# Patient Record
Sex: MALE | Race: WHITE | NOT HISPANIC OR LATINO | Employment: OTHER | URBAN - METROPOLITAN AREA
[De-identification: names, ages, dates, MRNs, and addresses within clinical notes are randomized per-mention and may not be internally consistent; named-entity substitution may affect disease eponyms.]

---

## 2022-11-05 ENCOUNTER — HOSPITAL ENCOUNTER (OUTPATIENT)
Dept: RADIOLOGY | Facility: MEDICAL CENTER | Age: 72
End: 2022-11-05

## 2022-11-05 ENCOUNTER — HOSPITAL ENCOUNTER (EMERGENCY)
Facility: MEDICAL CENTER | Age: 72
End: 2022-11-05
Attending: EMERGENCY MEDICINE
Payer: MEDICARE

## 2022-11-05 VITALS
RESPIRATION RATE: 17 BRPM | OXYGEN SATURATION: 97 % | HEART RATE: 76 BPM | TEMPERATURE: 98.1 F | SYSTOLIC BLOOD PRESSURE: 129 MMHG | WEIGHT: 136.47 LBS | BODY MASS INDEX: 21.42 KG/M2 | HEIGHT: 67 IN | DIASTOLIC BLOOD PRESSURE: 88 MMHG

## 2022-11-05 DIAGNOSIS — H20.9 TRAUMATIC IRITIS: ICD-10-CM

## 2022-11-05 DIAGNOSIS — H21.02 HYPHEMA OF LEFT EYE: ICD-10-CM

## 2022-11-05 PROCEDURE — 700101 HCHG RX REV CODE 250: Performed by: EMERGENCY MEDICINE

## 2022-11-05 PROCEDURE — 99283 EMERGENCY DEPT VISIT LOW MDM: CPT

## 2022-11-05 PROCEDURE — 90715 TDAP VACCINE 7 YRS/> IM: CPT | Performed by: EMERGENCY MEDICINE

## 2022-11-05 PROCEDURE — 90471 IMMUNIZATION ADMIN: CPT

## 2022-11-05 PROCEDURE — 700111 HCHG RX REV CODE 636 W/ 250 OVERRIDE (IP): Performed by: EMERGENCY MEDICINE

## 2022-11-05 RX ORDER — PREDNISOLONE ACETATE 10 MG/ML
1 SUSPENSION/ DROPS OPHTHALMIC
Qty: 15 ML | Refills: 1 | Status: SHIPPED | OUTPATIENT
Start: 2022-11-05 | End: 2022-11-05 | Stop reason: SDUPTHER

## 2022-11-05 RX ORDER — CYCLOPENTOLATE HYDROCHLORIDE 10 MG/ML
1 SOLUTION/ DROPS OPHTHALMIC 3 TIMES DAILY
Qty: 15 ML | Refills: 0 | Status: SHIPPED | OUTPATIENT
Start: 2022-11-05

## 2022-11-05 RX ORDER — PROPARACAINE HYDROCHLORIDE 5 MG/ML
1 SOLUTION/ DROPS OPHTHALMIC ONCE
Status: COMPLETED | OUTPATIENT
Start: 2022-11-05 | End: 2022-11-05

## 2022-11-05 RX ORDER — PREDNISOLONE ACETATE 10 MG/ML
1 SUSPENSION/ DROPS OPHTHALMIC
Qty: 15 ML | Refills: 1 | Status: SHIPPED | OUTPATIENT
Start: 2022-11-05 | End: 2022-11-12

## 2022-11-05 RX ORDER — CYCLOPENTOLATE HYDROCHLORIDE 10 MG/ML
1 SOLUTION/ DROPS OPHTHALMIC 3 TIMES DAILY
Qty: 15 ML | Refills: 0 | Status: SHIPPED | OUTPATIENT
Start: 2022-11-05 | End: 2022-11-05 | Stop reason: SDUPTHER

## 2022-11-05 RX ORDER — M-VIT,TX,IRON,MINS/CALC/FOLIC 27MG-0.4MG
1 TABLET ORAL DAILY
COMMUNITY

## 2022-11-05 RX ADMIN — PROPARACAINE HYDROCHLORIDE 1 DROP: 5 SOLUTION/ DROPS OPHTHALMIC at 21:30

## 2022-11-05 RX ADMIN — FLUORESCEIN SODIUM 1 MG: 1 STRIP OPHTHALMIC at 21:30

## 2022-11-05 RX ADMIN — CLOSTRIDIUM TETANI TOXOID ANTIGEN (FORMALDEHYDE INACTIVATED), CORYNEBACTERIUM DIPHTHERIAE TOXOID ANTIGEN (FORMALDEHYDE INACTIVATED), BORDETELLA PERTUSSIS TOXOID ANTIGEN (GLUTARALDEHYDE INACTIVATED), BORDETELLA PERTUSSIS FILAMENTOUS HEMAGGLUTININ ANTIGEN (FORMALDEHYDE INACTIVATED), BORDETELLA PERTUSSIS PERTACTIN ANTIGEN, AND BORDETELLA PERTUSSIS FIMBRIAE 2/3 ANTIGEN 0.5 ML: 5; 2; 2.5; 5; 3; 5 INJECTION, SUSPENSION INTRAMUSCULAR at 20:47

## 2022-11-06 NOTE — CONSULTS
Ophthalmology Consult    CC: left eye injury     HPI: 72 yr old male was breaking apart a chair around 4pm today and noticed something hit  his left eye.  Had immediate decrease in vision and discomfort.  Presented to [facility] ER and was then transferred here to [facility].  Notes blurry vision, some discomfort.  Right eye unaffected.  Was hammering wood with some staples and maybe some nails.      ROS:otherwise negative in detail  ROS     PMH:  No past medical history on file.     POH: none    FHx: none ocular    SHx: EtOH, no tobacco use, no IVDU    Medications:  No current facility-administered medications for this encounter.    Current Outpatient Medications:     prednisoLONE acetate, 1 Drop, Left Eye, Q2HRS    cyclopentolate, 1 Drop, Left Eye, TID     Allergies:    No Known Allergies     Visual acuity with near correction:  OD-  20/ 20        Pinhole   20/   OS-  20/  70 Pinhole   20/    Pupils- 3-2 OD, brisk; 6mm very slowly reactive OS; no APD OS by reverse    Extraocular Motility:  Full OU without restriction or deviation    Confrontational Visual Fields:  Full OU    Intraocular pressure: deferred    [unfilled]     Slit Lamp/Bedside Exam:  Lids and lashes:  Normal OU; no laceration or evidence of penetrating injury to left lid or periorbital region  Conjunctiva and sclera:  White and quiet OD, trace injection OS, no laceration   Cornea: Clear OD; paracentral/temporal epi irregularity without hernesto defect, trace edema OS  Anterior chamber: Deep and Quiet OD; trace hymphema OS with 3+ RBC in chamber, othewise well-formed   Iris:  Round/reactive OD, round and poorly reactive OS, no peaking  Lens:  1-2+ NS OU    Fundus Exam (left eye):  Vitreous- Clear, no vit heme  Disc-  No swelling/pallor/edema OU; c/d 0.65  Macula- Flat without thickening or irregularity   Vessels- Normal course and caliber   Periphery- Normal in appearance       Assessment     1.  Traumatic mydriasis/iritis left eye  2.  Hyphema left  eye    *Note, there is a very small hyperreflective spot on CT in soft tissue in area of left lid;  I do not appreciate any entry through external skin and I cannot find anything deeper on exam;  this may be other calcification not related to injury.  I do not think it is affecting globe or structures at this point and can be monitored.      Plan:    1.  Prednisolone acetate 1% q2hr left eye  2.  Cyclopentlate 1% TID left eye  3.  HOB 45 degrees, no straining/bending/strenuous activity  4.  RD/RT precautions    Patient to call office Monday to schedule follow-up in one week      Radu Del Castillo MD  Ophthalmology   [phone]

## 2022-11-06 NOTE — DISCHARGE INSTRUCTIONS
Return to the ER for any worsening blurry vision, eye pain, light sensitivity, headache, dizziness, nausea or vomiting, drainage from your eye, or for any concerns.    Follow-up with Dr. Del Castillo as instructed.  Please call first thing Monday morning to schedule your appointment.

## 2022-11-06 NOTE — ED NOTES
Visual Acuity-                 Left Eye- 20/200                Right Eye- 20/50                Both- 20/50

## 2022-11-06 NOTE — ED NOTES
Med rec completed per patient  Allergies reviewed  No PO Antibiotics in the last 30 days     Patient states he does not take any prescription medications.

## 2022-11-06 NOTE — ED TRIAGE NOTES
"Chief Complaint   Patient presents with    Eye Injury     Pt working in yard today and had something hit him in the eye, pt reports cloudy vision in L eye     Pt came into ER for above CC, pt was doing yard work and an unknown object hit him in the L eye. Pt states he has \"blurry vision\" in the L eye. Pt was seen in [facility] and was sent to [facility] for further evaluation.      Pt is alert and oriented, speaking in full sentences, follows commands and responds appropriately to questions.      Pt placed in lobby. Pt educated on triage process and apologized for wait time. Pt encouraged to alert staff for any changes.     Patient and staff wearing appropriate PPE.    Vitals:    11/05/22 1946   BP: (!) 143/105   Pulse: 91   Resp: 16   Temp: 36.6 °C (97.8 °F)   SpO2: 95%       "

## 2022-11-06 NOTE — ED PROVIDER NOTES
ED Provider Note    CHIEF COMPLAINT  Chief Complaint   Patient presents with    Eye Injury     Pt working in yard today and had something hit him in the eye, pt reports cloudy vision in L eye       HPI  Vinay Cordova is a 72 y.o. male who presents as a transfer from [place] [facility] with eye injury.  Patient was chopping up a wooden chair today without eye protection when a piece of something flew into patient's eye.  He does not know what exactly flew into his eye but suspected wood.  Patient is not [facility] on dT and [facility]  Patient describes cloudy vision with very poor visual acuity at [facility]. He was found to have 25% hyphema and dilated, non reactive pupil.  Patient does not wear contacts and has no ophtho hx or any hx of ophtho surgeries.  Patient denies headache and N/V. No other injuries. States that he thinks his vision is slightly better now upon arrival to [facility]. No eye pain. No photophobia. Pt not on any blood thinners.     Of note, patient presents with a friend who claims he is a physician who has worked and is unsatisfied that ERMD is seeing patient upon first encounter here at [facility] and that the ophthomologist is not here to immediately see patient upon his arrival. ERMD explained process to patient's physician friend and was told that patient will be quickly evaluated by ERMD and then ERMD will immediately contact ophtho on call to come to ER to evaluate patient.     REVIEW OF SYSTEMS  Positive for cloudy vision, eye injury. Negative for eye pain, photophobia, headache, nausea, vomiting    PAST MEDICAL HISTORY   No hx of eye diseases or diabetes.    SOCIAL HISTORY  Social History     Tobacco Use    Smoking status: Never    Smokeless tobacco: Never   Vaping Use    Vaping Use: Never used   Substance and Sexual Activity    Alcohol use: Never    Drug use: Never    Sexual activity: Not on file       SURGICAL HISTORY  patient denies any surgical  "history    CURRENT MEDICATIONS  Reviewed.  See Encounter Summary    ALLERGIES  No Known Allergies    PHYSICAL EXAM  VITAL SIGNS: BP (!) 143/105   Pulse 91   Temp 36.6 °C (97.8 °F) (Temporal)   Resp 16   Ht 1.702 m (5' 7\")   Wt 61.9 kg (136 lb 7.4 oz)   SpO2 95%   BMI 21.37 kg/m²   Constitutional:  Alert in no apparent distress.Patch over left eye  HENT: Normocephalic, atraumatic; Bilateral external ears normal. Nose normal.   Eyes: O.U. Pupil dilated and non reactive. Mild swelling and bruising to upper lid. Mild diffuse conjunctival injection. EOMI. Slight clouding of the cornea. Able to finger count at 1 foot in front of patient's face.  States that he can see his friend sitting 8 feet from him but friend is slightly blurry. No photophobia. Slit lamp and tonopen pressures deferred due to ophthomologist pending arrival  Thorax & Lungs: Non-labored respirations  Skin: Warm and Dry, No erythema, No rash.   Extremities:   Full range of motion  Neurologic: Alert and oriented x 4, clear speech   Psychiatric: Affect and Mood normal      COURSE & MEDICAL DECISION MAKING  Nursing notes and vital signs were reviewed. (See chart for details)    CT scan of the orbits was obtained at [facility].  The impression is as follows: \"Single retained small subcutaneous foreign body presenting anterior and just below the left supraorbital rim at the 130 o'clock orientation to the underlying orbit measuring 2 x 3 mm and presenting 1.5 mm deep to the skin surface.  Left globe is unremarkable.  The retroseptal orbital contents are unremarkable.\"    2035: Paged ophtho    2045: Discussed with Dr. Del Castilol, ophthalmologist on-call.  He will kindly come to the ER to evaluate the patient.    Patient presents as a transfer from [facility] after a piece of something flew into eye while he was chopping up a wooden chair earlier today.  Ct scan of the orbits was done at outlying facility and Dr. Del Castillo reviewed CT images and " "radiologist report in my presence. Dr. Del Castillo has evaluated patient in ER and preformed his exam and has determined that patient does not have globe rupture. He states that patient has traumatic iritis with hyphema and asked that I send patient home with prescription for prednisolone drops and cyclogel drops.  He will see the patient in the office in 1 week.  Patient's dT was updated here in the ER.  IV was started in event that Dr. Del Castillo needed to take patient to OR.  Patient's physician friend upset about IV start stating that \"he didn't need it.\" Thankfully, patient did not need to go to OR and after ophtho eval here in ER will be able to follow up with ophtho office.  Patient requested that I changed rx to paper rx vs electronic so they could fill at a pharmacy that was open on the weekend.     FINAL IMPRESSION  1. Hyphema of left eye Acute cyclopentolate (CYCLOGYL) 1 % Solution    prednisoLONE acetate (PRED FORTE) 1 % Suspension    DISCONTINUED: prednisoLONE acetate (PRED FORTE) 1 % Suspension    DISCONTINUED: cyclopentolate (CYCLOGYL) 1 % Solution      2. Traumatic iritis Acute cyclopentolate (CYCLOGYL) 1 % Solution    prednisoLONE acetate (PRED FORTE) 1 % Suspension    DISCONTINUED: prednisoLONE acetate (PRED FORTE) 1 % Suspension    DISCONTINUED: cyclopentolate (CYCLOGYL) 1 % Solution       .    This dictation has been created using voice recognition software. The accuracy of the dictation is limited by the abilities of the software. I expect there may be some errors of grammar and possibly content. I made every attempt to manually correct the errors within my dictation. However, errors related to voice recognition software may still exist and should be interpreted within the appropriate context.   "